# Patient Record
Sex: MALE | Race: WHITE | HISPANIC OR LATINO | Employment: OTHER | URBAN - METROPOLITAN AREA
[De-identification: names, ages, dates, MRNs, and addresses within clinical notes are randomized per-mention and may not be internally consistent; named-entity substitution may affect disease eponyms.]

---

## 2022-09-29 ENCOUNTER — APPOINTMENT (EMERGENCY)
Dept: CT IMAGING | Facility: HOSPITAL | Age: 57
End: 2022-09-29

## 2022-09-29 ENCOUNTER — HOSPITAL ENCOUNTER (EMERGENCY)
Facility: HOSPITAL | Age: 57
Discharge: HOME | End: 2022-09-29
Attending: EMERGENCY MEDICINE

## 2022-09-29 VITALS
TEMPERATURE: 98.4 F | HEART RATE: 68 BPM | BODY MASS INDEX: 30.49 KG/M2 | WEIGHT: 183.2 LBS | DIASTOLIC BLOOD PRESSURE: 63 MMHG | RESPIRATION RATE: 18 BRPM | SYSTOLIC BLOOD PRESSURE: 132 MMHG | OXYGEN SATURATION: 98 %

## 2022-09-29 DIAGNOSIS — R10.9 ABDOMINAL PAIN: ICD-10-CM

## 2022-09-29 DIAGNOSIS — R11.10 VOMITING: Primary | ICD-10-CM

## 2022-09-29 LAB
ALBUMIN SERPL BCP-MCNC: 4 G/DL (ref 3.5–5)
ALP SERPL-CCNC: 90 U/L (ref 46–116)
ALT SERPL W P-5'-P-CCNC: 38 U/L (ref 12–78)
ANION GAP SERPL CALCULATED.3IONS-SCNC: 7 MMOL/L (ref 4–13)
AST SERPL W P-5'-P-CCNC: 17 U/L (ref 5–45)
BACTERIA UR QL AUTO: ABNORMAL /HPF
BASOPHILS # BLD AUTO: 0.05 THOUSANDS/ΜL (ref 0–0.1)
BASOPHILS NFR BLD AUTO: 1 % (ref 0–1)
BILIRUB SERPL-MCNC: 0.72 MG/DL (ref 0.2–1)
BILIRUB UR QL STRIP: NEGATIVE
BUN SERPL-MCNC: 16 MG/DL (ref 5–25)
CALCIUM SERPL-MCNC: 9.8 MG/DL (ref 8.3–10.1)
CHLORIDE SERPL-SCNC: 104 MMOL/L (ref 96–108)
CLARITY UR: CLEAR
CO2 SERPL-SCNC: 30 MMOL/L (ref 21–32)
COLOR UR: YELLOW
CREAT SERPL-MCNC: 1 MG/DL (ref 0.6–1.3)
EOSINOPHIL # BLD AUTO: 0.5 THOUSAND/ΜL (ref 0–0.61)
EOSINOPHIL NFR BLD AUTO: 6 % (ref 0–6)
ERYTHROCYTE [DISTWIDTH] IN BLOOD BY AUTOMATED COUNT: 12.3 % (ref 11.6–15.1)
GFR SERPL CREATININE-BSD FRML MDRD: 84 ML/MIN/1.73SQ M
GLUCOSE SERPL-MCNC: 105 MG/DL (ref 65–140)
GLUCOSE UR STRIP-MCNC: NEGATIVE MG/DL
HCT VFR BLD AUTO: 46.9 % (ref 36.5–49.3)
HGB BLD-MCNC: 16 G/DL (ref 12–17)
HGB UR QL STRIP.AUTO: ABNORMAL
IMM GRANULOCYTES # BLD AUTO: 0.02 THOUSAND/UL (ref 0–0.2)
IMM GRANULOCYTES NFR BLD AUTO: 0 % (ref 0–2)
KETONES UR STRIP-MCNC: NEGATIVE MG/DL
LEUKOCYTE ESTERASE UR QL STRIP: NEGATIVE
LYMPHOCYTES # BLD AUTO: 1.45 THOUSANDS/ΜL (ref 0.6–4.47)
LYMPHOCYTES NFR BLD AUTO: 17 % (ref 14–44)
MCH RBC QN AUTO: 30.1 PG (ref 26.8–34.3)
MCHC RBC AUTO-ENTMCNC: 34.1 G/DL (ref 31.4–37.4)
MCV RBC AUTO: 88 FL (ref 82–98)
MONOCYTES # BLD AUTO: 0.66 THOUSAND/ΜL (ref 0.17–1.22)
MONOCYTES NFR BLD AUTO: 8 % (ref 4–12)
NEUTROPHILS # BLD AUTO: 5.71 THOUSANDS/ΜL (ref 1.85–7.62)
NEUTS SEG NFR BLD AUTO: 68 % (ref 43–75)
NITRITE UR QL STRIP: NEGATIVE
NON-SQ EPI CELLS URNS QL MICRO: ABNORMAL /HPF
NRBC BLD AUTO-RTO: 0 /100 WBCS
PH UR STRIP.AUTO: 6 [PH] (ref 4.5–8)
PLATELET # BLD AUTO: 189 THOUSANDS/UL (ref 149–390)
PMV BLD AUTO: 9.6 FL (ref 8.9–12.7)
POTASSIUM SERPL-SCNC: 4.4 MMOL/L (ref 3.5–5.3)
PROT SERPL-MCNC: 8.2 G/DL (ref 6.4–8.4)
PROT UR STRIP-MCNC: NEGATIVE MG/DL
RBC # BLD AUTO: 5.32 MILLION/UL (ref 3.88–5.62)
RBC #/AREA URNS AUTO: ABNORMAL /HPF
SODIUM SERPL-SCNC: 141 MMOL/L (ref 135–147)
SP GR UR STRIP.AUTO: 1.01 (ref 1–1.03)
UROBILINOGEN UR QL STRIP.AUTO: 0.2 E.U./DL
WBC # BLD AUTO: 8.39 THOUSAND/UL (ref 4.31–10.16)
WBC #/AREA URNS AUTO: ABNORMAL /HPF

## 2022-09-29 PROCEDURE — 74177 CT ABD & PELVIS W/CONTRAST: CPT

## 2022-09-29 PROCEDURE — 96374 THER/PROPH/DIAG INJ IV PUSH: CPT

## 2022-09-29 PROCEDURE — 81001 URINALYSIS AUTO W/SCOPE: CPT

## 2022-09-29 PROCEDURE — 96361 HYDRATE IV INFUSION ADD-ON: CPT

## 2022-09-29 PROCEDURE — 96375 TX/PRO/DX INJ NEW DRUG ADDON: CPT

## 2022-09-29 PROCEDURE — 80053 COMPREHEN METABOLIC PANEL: CPT | Performed by: EMERGENCY MEDICINE

## 2022-09-29 PROCEDURE — 99284 EMERGENCY DEPT VISIT MOD MDM: CPT

## 2022-09-29 PROCEDURE — 36415 COLL VENOUS BLD VENIPUNCTURE: CPT | Performed by: EMERGENCY MEDICINE

## 2022-09-29 PROCEDURE — 85025 COMPLETE CBC W/AUTO DIFF WBC: CPT | Performed by: EMERGENCY MEDICINE

## 2022-09-29 PROCEDURE — 99284 EMERGENCY DEPT VISIT MOD MDM: CPT | Performed by: EMERGENCY MEDICINE

## 2022-09-29 RX ORDER — ONDANSETRON 4 MG/1
4 TABLET, ORALLY DISINTEGRATING ORAL EVERY 6 HOURS PRN
Qty: 20 TABLET | Refills: 0 | Status: SHIPPED | OUTPATIENT
Start: 2022-09-29

## 2022-09-29 RX ORDER — KETOROLAC TROMETHAMINE 30 MG/ML
30 INJECTION, SOLUTION INTRAMUSCULAR; INTRAVENOUS ONCE
Status: COMPLETED | OUTPATIENT
Start: 2022-09-29 | End: 2022-09-29

## 2022-09-29 RX ORDER — ONDANSETRON 2 MG/ML
4 INJECTION INTRAMUSCULAR; INTRAVENOUS ONCE
Status: COMPLETED | OUTPATIENT
Start: 2022-09-29 | End: 2022-09-29

## 2022-09-29 RX ADMIN — KETOROLAC TROMETHAMINE 30 MG: 30 INJECTION, SOLUTION INTRAMUSCULAR; INTRAVENOUS at 19:53

## 2022-09-29 RX ADMIN — ONDANSETRON 4 MG: 2 INJECTION INTRAMUSCULAR; INTRAVENOUS at 19:55

## 2022-09-29 RX ADMIN — SODIUM CHLORIDE 1000 ML: 0.9 INJECTION, SOLUTION INTRAVENOUS at 19:48

## 2022-09-29 RX ADMIN — IOHEXOL 100 ML: 350 INJECTION, SOLUTION INTRAVENOUS at 21:29

## 2022-09-29 NOTE — ED PROVIDER NOTES
Pt Name: Carlota Lopez  MRN: [id number]  [place] 1965  Age/Sex: 49 y o  male  Date of evaluation: 9/29/2022  PCP: No primary care provider on file  CHIEF COMPLAINT    Chief Complaint   Patient presents with    Vomiting     Vomiting and abd pain for the past month but has gotten worse today took two advil at [address] [place] [address] [address] presents to the Emergency Department complaining of RLQ pain  This has been ongoing for a month but today it was worse an associated with nausea and vomiting  No abdominal surgeries  No medical problems  HPI      Past Medical and Surgical History    No past medical history on file  Past Surgical History:   Procedure Laterality Date    EXTERNAL FIXATOR APPLICATION Left 2/01/5382    Procedure: APPLICATION EXTERNAL FIXATION DEVICE LOWER EXTREMITY, left open distal tibia fracture exfix placement and washout;  Surgeon: Jaycee Youngblood MD;  Location: [facility];  Service:     ORIF TIBIA FRACTURE Left 5/20/2016    Procedure: OPEN REDUCTION W/ INTERNAL FIXATION (ORIF) TIBIA w/ REMOVAL OF EX-FIX, APPLICATION OF SHORT LEG SPLINT;  Surgeon: Jaycee Youngblood MD;  Location: [facility];  Service:        No family history on file  Social History     Tobacco Use    Smoking status: Never Smoker   Substance Use Topics    Alcohol use: Yes     Comment: occasionally on weekends    Drug use: No              Allergies    No Known Allergies    Home Medications    Prior to Admission medications    Medication Sig Start Date End Date Taking? Authorizing Provider   oxyCODONE (ROXICODONE) 5 mg immediate release tablet 1-2 tabs PO q 4 hours prn pain 5/15/16   Kahlil Bautista PA-C           Review of Systems    Review of Systems   Constitutional: Negative for chills and fever  HENT: Negative for ear pain and sore throat  Eyes: Negative for pain and visual disturbance  Respiratory: Negative for cough and shortness of breath      Cardiovascular: Negative for chest pain and palpitations  Gastrointestinal: Positive for abdominal pain, nausea and vomiting  Negative for abdominal distention  Genitourinary: Negative for dysuria and hematuria  Musculoskeletal: Negative for arthralgias and back pain  Skin: Negative for color change and rash  Neurological: Negative for seizures and syncope  All other systems reviewed and are negative  Physical Exam      ED Triage Vitals   Temperature Pulse Respirations Blood Pressure SpO2   09/29/22 1850 09/29/22 1850 09/29/22 1850 09/29/22 1850 09/29/22 1850   98 4 °F (36 9 °C) 88 18 141/64 99 %      Temp Source Heart Rate Source Patient Position - Orthostatic VS BP Location FiO2 (%)   09/29/22 1850 09/29/22 1850 09/29/22 1850 09/29/22 1850 --   Oral Monitor Sitting Right arm       Pain Score       09/29/22 1953       8               Physical Exam  Vitals and nursing note reviewed  Constitutional:       General: He is not in acute distress  Appearance: He is well-developed  He is not diaphoretic  HENT:      Head: Normocephalic and atraumatic  Nose: Nose normal    Eyes:      Conjunctiva/sclera: Conjunctivae normal       Pupils: Pupils are equal, round, and reactive to light  Cardiovascular:      Rate and Rhythm: Normal rate and regular rhythm  Heart sounds: Normal heart sounds  No murmur heard  No friction rub  No gallop  Pulmonary:      Effort: Pulmonary effort is normal  No respiratory distress  Breath sounds: Normal breath sounds  No wheezing or rales  Abdominal:      General: Bowel sounds are normal       Palpations: Abdomen is soft  Tenderness: There is abdominal tenderness  There is no guarding or rebound  Musculoskeletal:         General: Normal range of motion  Cervical back: Normal range of motion and neck supple  Skin:     General: Skin is warm and dry  Neurological:      Mental Status: He is alert and oriented to person, place, and time     Psychiatric:         Behavior: Behavior normal            Assessment and Plan    Jacobo Dixon is a 49 y o  male who presents with abdominal pain  Physical examination remarkable for RLQ tenderness  Differential diagnosis (not completely inclusive) includes intra-abdominal pathology  Plan will be to perform diagnostic testing and treat symptomatically        MDM    Diagnostic Results      Labs:    Results for orders placed or performed during the hospital encounter of 09/29/22   Urine Microscopic   Result Value Ref Range    RBC, UA 2-4 None Seen, 2-4 /hpf    WBC, UA 0-1 (A) None Seen, 2-4, 5-60 /hpf    Epithelial Cells None Seen None Seen, Occasional /hpf    Bacteria, UA Occasional None Seen, Occasional /hpf   CBC and differential   Result Value Ref Range    WBC 8 39 4 31 - 10 16 Thousand/uL    RBC 5 32 3 88 - 5 62 Million/uL    Hemoglobin 16 0 12 0 - 17 0 g/dL    Hematocrit 46 9 36 5 - 49 3 %    MCV 88 82 - 98 fL    MCH 30 1 26 8 - 34 3 pg    MCHC 34 1 31 4 - 37 4 g/dL    RDW 12 3 11 6 - 15 1 %    MPV 9 6 8 9 - 12 7 fL    Platelets 064 658 - 361 Thousands/uL    nRBC 0 /100 WBCs    Neutrophils Relative 68 43 - 75 %    Immat GRANS % 0 0 - 2 %    Lymphocytes Relative 17 14 - 44 %    Monocytes Relative 8 4 - 12 %    Eosinophils Relative 6 0 - 6 %    Basophils Relative 1 0 - 1 %    Neutrophils Absolute 5 71 1 85 - 7 62 Thousands/µL    Immature Grans Absolute 0 02 0 00 - 0 20 Thousand/uL    Lymphocytes Absolute 1 45 0 60 - 4 47 Thousands/µL    Monocytes Absolute 0 66 0 17 - 1 22 Thousand/µL    Eosinophils Absolute 0 50 0 00 - 0 61 Thousand/µL    Basophils Absolute 0 05 0 00 - 0 10 Thousands/µL   Comprehensive metabolic panel   Result Value Ref Range    Sodium 141 135 - 147 mmol/L    Potassium 4 4 3 5 - 5 3 mmol/L    Chloride 104 96 - 108 mmol/L    CO2 30 21 - 32 mmol/L    ANION GAP 7 4 - 13 mmol/L    BUN 16 5 - 25 mg/dL    Creatinine 1 00 0 60 - 1 30 mg/dL    Glucose 105 65 - 140 mg/dL    Calcium 9 8 8 3 - 10 1 mg/dL    AST 17 5 - 45 U/L    ALT 38 12 - 78 U/L    Alkaline Phosphatase 90 46 - 116 U/L    Total Protein 8 2 6 4 - 8 4 g/dL    Albumin 4 0 3 5 - 5 0 g/dL    Total Bilirubin 0 72 0 20 - 1 00 mg/dL    eGFR 84 ml/min/1 73sq m   Urine Macroscopic, POC   Result Value Ref Range    Color, UA Yellow     Clarity, UA Clear     pH, UA 6 0 4 5 - 8 0    Leukocytes, UA Negative Negative    Nitrite, UA Negative Negative    Protein, UA Negative Negative mg/dl    Glucose, UA Negative Negative mg/dl    Ketones, UA Negative Negative mg/dl    Urobilinogen, UA 0 2 0 2, 1 0 E U /dl E U /dl    Bilirubin, UA Negative Negative    Occult Blood, UA Moderate (A) Negative    Specific Gravity, UA 1 010 1 003 - 1 030       All labs reviewed and utilized in the medical decision making process    Radiology:    CT abdomen pelvis with contrast   Final Result      Normal appendix  No evidence of bowel obstruction, colitis or diverticulitis              Workstation performed: [id number]             All radiology studies independently viewed by me and interpreted by the radiologist     Procedure    Procedures      ED Course of Care and Re-Assessments      Medications   sodium chloride 0 9 % bolus 1,000 mL (0 mL Intravenous Stopped 9/29/22 2053)   ketorolac (TORADOL) injection 30 mg (30 mg Intravenous Given 9/29/22 1953)   ondansetron (ZOFRAN) injection 4 mg (4 mg Intravenous Given 9/29/22 1955)   iohexol (OMNIPAQUE) 350 MG/ML injection (SINGLE-DOSE) 100 mL (100 mL Intravenous Given 9/29/22 2129)           FINAL IMPRESSION    Final diagnoses:   Vomiting   Abdominal pain         DISPOSITION/PLAN    Time reflects when diagnosis was documented in both MDM as applicable and the Disposition within this note     Time User Action Codes Description Comment    9/29/2022 10:43 PM Corinna Yuan L Add [R11 10] Vomiting     9/29/2022 10:43 PM Corinna Yuan Add [R10 9] Abdominal pain       ED Disposition     ED Disposition   Discharge    Condition   Stable    Date/Time   Thu Sep 29, 2022 10:43 PM    Comment   [place] discharge to home/self care  Follow-up Information     Follow up With Specialties Details Why Contact Info Additional [facility] Schedule an appointment as soon as possible for a visit   [address]  [address] [facility] [address]  Yolanda Chaneyuim [address] [facility], [address], [address], [place], [place], [address]   [phone]    [facility] Schedule an appointment as soon as possible for a visit   [facility]  [address] [facility] [address]  [address]  [facility] [place], [address], [place], [place], [address] [phone]            PATIENT REFERRED TO:    [facility] [place]  [address]  [address] [facility] [address] [phone]  Schedule an appointment as soon as possible for a visit       [facility] Mani Aggarwal [address] [phone]  Schedule an appointment as soon as possible for a visit         DISCHARGE MEDICATIONS:    Discharge Medication List as of 9/29/2022 10:46 PM      START taking these medications    Details   ondansetron (Zofran ODT) 4 mg disintegrating tablet Take 1 tablet (4 mg total) by mouth every 6 (six) hours as needed for nausea or vomiting, Starting Thu 9/29/2022, Normal         CONTINUE these medications which have NOT CHANGED    Details   oxyCODONE (ROXICODONE) 5 mg immediate release tablet 1-2 tabs PO q 4 hours prn pain, Print             No discharge procedures on file           Juan Miguel Bowden, [address] [place] [address], DO  09/29/22 4016